# Patient Record
Sex: MALE | Race: WHITE | Employment: OTHER | URBAN - METROPOLITAN AREA
[De-identification: names, ages, dates, MRNs, and addresses within clinical notes are randomized per-mention and may not be internally consistent; named-entity substitution may affect disease eponyms.]

---

## 2017-07-07 ENCOUNTER — OFFICE VISIT (OUTPATIENT)
Dept: FAMILY MEDICINE CLINIC | Facility: CLINIC | Age: 32
End: 2017-07-07

## 2017-07-07 VITALS
WEIGHT: 185 LBS | SYSTOLIC BLOOD PRESSURE: 116 MMHG | TEMPERATURE: 98 F | RESPIRATION RATE: 14 BRPM | HEART RATE: 89 BPM | HEIGHT: 72 IN | BODY MASS INDEX: 25.06 KG/M2 | OXYGEN SATURATION: 99 % | DIASTOLIC BLOOD PRESSURE: 80 MMHG

## 2017-07-07 DIAGNOSIS — J01.00 ACUTE NON-RECURRENT MAXILLARY SINUSITIS: Primary | ICD-10-CM

## 2017-07-07 DIAGNOSIS — R05.9 COUGH: ICD-10-CM

## 2017-07-07 PROCEDURE — 99202 OFFICE O/P NEW SF 15 MIN: CPT | Performed by: PHYSICIAN ASSISTANT

## 2017-07-07 RX ORDER — CODEINE PHOSPHATE AND GUAIFENESIN 10; 100 MG/5ML; MG/5ML
5 SOLUTION ORAL EVERY 6 HOURS PRN
Qty: 120 ML | Refills: 0 | Status: ON HOLD | OUTPATIENT
Start: 2017-07-07 | End: 2019-10-13

## 2017-07-07 RX ORDER — AZITHROMYCIN 250 MG/1
TABLET, FILM COATED ORAL
Qty: 6 TABLET | Refills: 0 | Status: ON HOLD | OUTPATIENT
Start: 2017-07-07 | End: 2019-10-13

## 2017-07-07 NOTE — PROGRESS NOTES
CHIEF COMPLAINT:   Patient presents with:  URI: x 2 weeks, cough, nasal congestion, chills, sinus pressure, headache, ears clogged     HPI:   Jaycee Vazquez is a 28year old male who presents for sinus congestion for  2  weeks.  Symptoms have been worsenin on palpation of maxillary sinuses  EYES: conjunctiva clear, EOM intact  EARS: TM's clear gray, no bulging, no retraction, serous fluid bilaterally, bony landmarks sharp B/L  NOSE: nostrils patent, yellow nasal mucous, nasal mucosa reddened and swollen  THR treated ineffectively and may return  · Humidify the air.   Steam inhalation and warm compresses often help relieve pressure  · Increase fluid intake and rest  · Sleep with head of bed elevated  · Avoid allergens and excessively dry heat  · Avoid use of ant

## 2017-07-07 NOTE — PATIENT INSTRUCTIONS
Please follow up with your PCP if no improvement within 5-7 days. Go directly to the ER for any acute worsening of symptoms.    · Return to clinic or follow up with PCP for further evaluation if symptoms are not improved within 48-72 hours  · Go to ER if fa

## 2018-05-22 ENCOUNTER — OFFICE VISIT (OUTPATIENT)
Dept: FAMILY MEDICINE CLINIC | Facility: CLINIC | Age: 33
End: 2018-05-22

## 2018-05-22 VITALS
OXYGEN SATURATION: 98 % | WEIGHT: 200 LBS | HEIGHT: 72 IN | TEMPERATURE: 98 F | HEART RATE: 78 BPM | BODY MASS INDEX: 27.09 KG/M2 | RESPIRATION RATE: 20 BRPM | DIASTOLIC BLOOD PRESSURE: 80 MMHG | SYSTOLIC BLOOD PRESSURE: 118 MMHG

## 2018-05-22 DIAGNOSIS — R30.0 DYSURIA: Primary | ICD-10-CM

## 2018-05-22 PROCEDURE — 99213 OFFICE O/P EST LOW 20 MIN: CPT | Performed by: NURSE PRACTITIONER

## 2018-05-22 PROCEDURE — 81003 URINALYSIS AUTO W/O SCOPE: CPT | Performed by: NURSE PRACTITIONER

## 2018-05-22 PROCEDURE — 87086 URINE CULTURE/COLONY COUNT: CPT | Performed by: NURSE PRACTITIONER

## 2018-05-22 RX ORDER — CIPROFLOXACIN 500 MG/1
500 TABLET, FILM COATED ORAL 2 TIMES DAILY
Qty: 28 TABLET | Refills: 0 | Status: ON HOLD | OUTPATIENT
Start: 2018-05-22 | End: 2019-10-13

## 2018-05-22 NOTE — PROGRESS NOTES
CHIEF COMPLAINT:   Patient presents with:  UTI      HPI:   Binta Mims is a 28year old male who presents with symptoms of UTI. Complaining of urinary frequency, urgency, dysuria for 2 days. Symptoms have been worsened since onset.   Treatments tried Negative   PH, URINE 7 4.5 - 8.0   PROTEIN (URINE DIPSTICK) Negative Negative/Trace mg/dL   UROBILINOGEN,SEMI-QN 0.2 0.0 - 1.9 mg/dL   NITRITE, URINE Negative Negative   LEUKOCYTES Trace Negative   APPEARANCE cloudy Clear   URINE-COLOR yellow Yellow   Mult

## 2018-05-24 ENCOUNTER — TELEPHONE (OUTPATIENT)
Dept: FAMILY MEDICINE CLINIC | Facility: CLINIC | Age: 33
End: 2018-05-24

## 2019-10-13 ENCOUNTER — HOSPITAL ENCOUNTER (EMERGENCY)
Facility: HOSPITAL | Age: 34
Discharge: NURSING, REHAB OR LONG-TERM CARE | End: 2019-10-13
Attending: EMERGENCY MEDICINE
Payer: COMMERCIAL

## 2019-10-13 VITALS
OXYGEN SATURATION: 98 % | DIASTOLIC BLOOD PRESSURE: 87 MMHG | TEMPERATURE: 98 F | WEIGHT: 170 LBS | HEART RATE: 86 BPM | BODY MASS INDEX: 23.03 KG/M2 | HEIGHT: 72 IN | SYSTOLIC BLOOD PRESSURE: 142 MMHG | RESPIRATION RATE: 14 BRPM

## 2019-10-13 DIAGNOSIS — F32.A DEPRESSION, UNSPECIFIED DEPRESSION TYPE: Primary | ICD-10-CM

## 2019-10-13 DIAGNOSIS — R45.851 SUICIDAL IDEATION: ICD-10-CM

## 2019-10-13 PROCEDURE — 80307 DRUG TEST PRSMV CHEM ANLYZR: CPT | Performed by: EMERGENCY MEDICINE

## 2019-10-13 PROCEDURE — 36415 COLL VENOUS BLD VENIPUNCTURE: CPT

## 2019-10-13 PROCEDURE — 99285 EMERGENCY DEPT VISIT HI MDM: CPT

## 2019-10-13 PROCEDURE — 80320 DRUG SCREEN QUANTALCOHOLS: CPT | Performed by: EMERGENCY MEDICINE

## 2019-10-13 PROCEDURE — 85025 COMPLETE CBC W/AUTO DIFF WBC: CPT | Performed by: EMERGENCY MEDICINE

## 2019-10-13 PROCEDURE — 80053 COMPREHEN METABOLIC PANEL: CPT | Performed by: EMERGENCY MEDICINE

## 2019-10-13 RX ORDER — DEXTROAMPHETAMINE SACCHARATE, AMPHETAMINE ASPARTATE MONOHYDRATE, DEXTROAMPHETAMINE SULFATE AND AMPHETAMINE SULFATE 2.5; 2.5; 2.5; 2.5 MG/1; MG/1; MG/1; MG/1
10 CAPSULE, EXTENDED RELEASE ORAL EVERY MORNING
Status: ON HOLD | COMMUNITY
End: 2019-10-13

## 2019-10-13 RX ORDER — LORAZEPAM 2 MG/ML
1 INJECTION INTRAMUSCULAR ONCE
Status: DISCONTINUED | OUTPATIENT
Start: 2019-10-13 | End: 2019-10-13

## 2019-10-13 NOTE — ED NOTES
[employer] Officer Babita Culp #[id number] arrived with petition. Per Officer petition,     \"ex-girlfeli arrived home to find pt unresponsive on her floor, she was finally able to wake him and learned he consumed alsohol and muscle relaxers.  She then saw a rope tied

## 2019-10-13 NOTE — ED NOTES
Pt's ex-girlfriend dropped left pt's car in the parking garage on the 4th floor. Security called to  pt's keys.

## 2019-10-13 NOTE — ED PROVIDER NOTES
Patient Seen in: [facility] Emergency Department      History   Patient presents with:  Eval-P (psychiatric)    Stated Complaint: detox/eval p    HPI    This is a 43-year-old male who has been brought in by paramedics.   Patient's girlfriend called p Result Value    Calculated Osmolality 297 (*)     All other components within normal limits   DRUG SCREEN 7 W/OUT CONFIRMATION, URINE - Abnormal; Notable for the following components:    Amphetamine Urine Presumed Positive (*)     Cannabinoid Urine P be directly admitted to [facility]. The patient will be certified.   She will be admitted for further evaluation treatment  Disposition and Plan     Clinical Impression:  Depression, unspecified depression type  (primary encounter diagnosis)  Suicidal dean

## 2019-10-13 NOTE — ED NOTES
TL called; Lisa Cornejo arrive to drop off items. RN suggested to given items to security. Pt is to have no visitors at this time.

## 2019-10-13 NOTE — ED NOTES
Mom at bedside becoming extremely agitated with pt, yelling and pointing her finger from chair. Mother escorted out to waiting room and security called.  Mother informed that she would not be allowed to return to pt room

## 2019-10-13 NOTE — ED INITIAL ASSESSMENT (HPI)
Pt here for thoughts of depression and lack of sleep. Pt girl friend called police because she was concerned.

## 2019-10-13 NOTE — BH PREADMISSION INTAKE NOTE
[address] [facility] [address] Preadmission Intake Note    Advance Directives (For Healthcare)  Advance Directives Counseling Needed?: Not needed      Referral Source  Referral Source: Marielena 3: [facility] Suicide Severity Rating Scale Screener   1.  Have Combined  Personality Disorders: Deferred  Pertinent Non-Psychiatric Diagnoses  Pertinent Non-psychiatric Diagnoses: See EMR                          SRAT Review  Behavioral Precautions: Suicide  Medical Precautions: None      Airborne Precautions TB Scree

## 2019-10-13 NOTE — PROGRESS NOTES
10/13/19 1202   Clinical Encounter Type   Visited With Patient; Family; Health care provider   Routine Visit Introduction   Continue Visiting No  (upon request or as needed)   Patient Spiritual Encounters   Spiritual Interventions  responded to pa

## 2019-10-13 NOTE — ED NOTES
[facility] arrived for transport; pt became agitated when he was told he was going to be admitted but agreed to be cooperative.

## 2019-12-11 ENCOUNTER — HOSPITAL ENCOUNTER (EMERGENCY)
Facility: HOSPITAL | Age: 34
Discharge: HOME | End: 2019-12-11
Attending: EMERGENCY MEDICINE
Payer: COMMERCIAL

## 2019-12-11 VITALS
DIASTOLIC BLOOD PRESSURE: 76 MMHG | OXYGEN SATURATION: 96 % | RESPIRATION RATE: 13 BRPM | SYSTOLIC BLOOD PRESSURE: 118 MMHG | TEMPERATURE: 98 F | WEIGHT: 190 LBS | BODY MASS INDEX: 26 KG/M2 | HEART RATE: 68 BPM

## 2019-12-11 DIAGNOSIS — H53.8 BLURRED VISION, RIGHT EYE: Primary | ICD-10-CM

## 2019-12-11 PROCEDURE — 99283 EMERGENCY DEPT VISIT LOW MDM: CPT

## 2019-12-11 RX ORDER — TETRACAINE HYDROCHLORIDE 5 MG/ML
2 SOLUTION OPHTHALMIC ONCE
Status: COMPLETED | OUTPATIENT
Start: 2019-12-11 | End: 2019-12-11

## 2019-12-11 NOTE — ED INITIAL ASSESSMENT (HPI)
Pt presents with complaint of blurry vision to r eye with numbness to face states was diagnosed with facial fractures Saturday night

## 2019-12-11 NOTE — ED PROVIDER NOTES
Patient Seen in: [facility] Emergency Department      History   Patient presents with:   Eye Visual Problem    Stated Complaint: facial pain     HPI    31-year-old male past medical history of ADHD now presents ED with complaints of blurred vision in movements intact. Pupils: Pupils are equal, round, and reactive to light. Neck:      Musculoskeletal: Normal range of motion and neck supple. Cardiovascular:      Rate and Rhythm: Normal rate and regular rhythm.    Pulmonary:      Effort: Pulmonary diagnosis)    Disposition:  Discharge  12/11/2019  3:56 am    Follow-up:  Rajan Maher, [address]  [phone]    Call in 1 day          Medications Prescribed:  There are no discharge medications for t

## 2020-09-17 ENCOUNTER — OFFICE VISIT (OUTPATIENT)
Dept: FAMILY MEDICINE CLINIC | Facility: CLINIC | Age: 35
End: 2020-09-17
Payer: COMMERCIAL

## 2020-09-17 VITALS
SYSTOLIC BLOOD PRESSURE: 98 MMHG | WEIGHT: 182 LBS | DIASTOLIC BLOOD PRESSURE: 70 MMHG | BODY MASS INDEX: 25.48 KG/M2 | OXYGEN SATURATION: 98 % | HEART RATE: 75 BPM | HEIGHT: 71 IN

## 2020-09-17 DIAGNOSIS — Z01.818 PREOP EXAMINATION: Primary | ICD-10-CM

## 2020-09-17 PROBLEM — H33.20 RETINAL DETACHMENT: Status: ACTIVE | Noted: 2020-09-17

## 2020-09-17 PROCEDURE — 36415 COLL VENOUS BLD VENIPUNCTURE: CPT | Performed by: INTERNAL MEDICINE

## 2020-09-17 PROCEDURE — 99202 OFFICE O/P NEW SF 15 MIN: CPT | Performed by: INTERNAL MEDICINE

## 2020-09-17 PROCEDURE — 3008F BODY MASS INDEX DOCD: CPT | Performed by: INTERNAL MEDICINE

## 2020-09-17 PROCEDURE — 3074F SYST BP LT 130 MM HG: CPT | Performed by: INTERNAL MEDICINE

## 2020-09-17 PROCEDURE — 3078F DIAST BP <80 MM HG: CPT | Performed by: INTERNAL MEDICINE

## 2020-09-17 RX ORDER — DEXTROAMPHETAMINE SACCHARATE, AMPHETAMINE ASPARTATE, DEXTROAMPHETAMINE SULFATE AND AMPHETAMINE SULFATE 7.5; 7.5; 7.5; 7.5 MG/1; MG/1; MG/1; MG/1
TABLET ORAL
COMMUNITY
Start: 2020-07-01

## 2020-09-17 NOTE — PROGRESS NOTES
[facility] Office Pre-OP Note    HPI:   Jessica Oconnor is a 28year old male with a hx of  has a past medical history of ADHD.  who presents for a pre-operative physical exam.     Patient is to have Right Eye Viterectomy, to by done by Dr. Kamryn Short (open reduction, Laparoscoopic, etc)     Family History   Problem Relation Age of Onset   • No Known Problems Father    • No Known Problems Mother    • No Known Problems Sister    • No Known Problems Brother      Social History    Socioeconomic History Concern: Not Asked        Exercise: Not Asked        Seat Belt: Not Asked        Special Diet: Not Asked        Stress Concern: Not Asked        Weight Concern: Not Asked    Social History Narrative      Not on file      REVIEW OF SYSTEMS:   CONSTITUTIONAL lesions . NECK: Supple, no CLAD,  no thyromegaly. SKIN: No rashes, no skin lesion, no bruising, good turgor. HEART:  Regular rate and rhythm, no murmurs, rubs or gallops. LUNGS: Clear to auscultation bilterally, no rales/rhonchi/wheezing.   CHEST: No te

## 2020-09-18 LAB
ABSOLUTE BASOPHILS: 31 CELLS/UL (ref 0–200)
ABSOLUTE EOSINOPHILS: 171 CELLS/UL (ref 15–500)
ABSOLUTE LYMPHOCYTES: 1946 CELLS/UL (ref 850–3900)
ABSOLUTE MONOCYTES: 488 CELLS/UL (ref 200–950)
ABSOLUTE NEUTROPHILS: 3465 CELLS/UL (ref 1500–7800)
BASOPHILS: 0.5 %
EOSINOPHILS: 2.8 %
HEMATOCRIT: 44.4 % (ref 38.5–50)
HEMOGLOBIN: 15.5 G/DL (ref 13.2–17.1)
LYMPHOCYTES: 31.9 %
MCH: 30.9 PG (ref 27–33)
MCHC: 34.9 G/DL (ref 32–36)
MCV: 88.6 FL (ref 80–100)
MONOCYTES: 8 %
MPV: 9.3 FL (ref 7.5–12.5)
NEUTROPHILS: 56.8 %
PLATELET COUNT: 428 THOUSAND/UL (ref 140–400)
RDW: 12.5 % (ref 11–15)
RED BLOOD CELL COUNT: 5.01 MILLION/UL (ref 4.2–5.8)
WHITE BLOOD CELL COUNT: 6.1 THOUSAND/UL (ref 3.8–10.8)

## 2020-09-21 ENCOUNTER — TELEPHONE (OUTPATIENT)
Dept: FAMILY MEDICINE CLINIC | Facility: CLINIC | Age: 35
End: 2020-09-21

## 2020-09-21 ENCOUNTER — LAB REQUISITION (OUTPATIENT)
Dept: LAB | Facility: HOSPITAL | Age: 35
End: 2020-09-21
Payer: COMMERCIAL

## 2020-09-21 DIAGNOSIS — Z01.818 ENCOUNTER FOR OTHER PREPROCEDURAL EXAMINATION: ICD-10-CM

## 2020-09-21 NOTE — TELEPHONE ENCOUNTER
----- Message from Margie Valdez MD sent at 9/18/2020 12:30 PM CDT -----  Please inform patient that CBC was normal.  Fax preop note to [phone].   Thank you    PQ

## 2020-09-23 LAB — SARS-COV-2 RNA RESP QL NAA+PROBE: NOT DETECTED

## 2020-09-24 ENCOUNTER — TELEPHONE (OUTPATIENT)
Dept: FAMILY MEDICINE CLINIC | Facility: CLINIC | Age: 35
End: 2020-09-24

## 2020-09-24 NOTE — TELEPHONE ENCOUNTER
----- Message from Yenni Delacruz MD sent at 9/18/2020 12:30 PM CDT -----  Please inform patient that CBC was normal.  Fax preop note to [phone].   Thank you    PQ

## (undated) NOTE — ED AVS SNAPSHOT
Rubén Harvey   MRN: [id number]    Department:  [facility] Emergency Department   Date of Visit:  12/11/2019           Disclosure     Insurance plans vary and the physician(s) referred by the ER may not be covered by your plan.  Please contact yo tell this physician (or your personal doctor if your instructions are to return to your personal doctor) about any new or lasting problems. The primary care or specialist physician will see patients referred from the [facility] Emergency Department.  Petty Thompson

## (undated) NOTE — LETTER
September 24, 2020      Rashid [address]      Dear Nash Forth:    Our office has been trying to contact you to discuss your recent test results. Please contact our office at your earliest convenience at [phone].

## (undated) NOTE — LETTER
Date & Time: 12/11/2019, 4:02 AM  Patient: Ciara Morgan  Encounter Provider(s):    Sal Moore DO       To Whom It May Concern:    Ciara Morgan was seen and treated in our department on 12/11/2019.  He may return to work without any restri